# Patient Record
Sex: MALE | Race: BLACK OR AFRICAN AMERICAN | NOT HISPANIC OR LATINO | Employment: STUDENT | URBAN - METROPOLITAN AREA
[De-identification: names, ages, dates, MRNs, and addresses within clinical notes are randomized per-mention and may not be internally consistent; named-entity substitution may affect disease eponyms.]

---
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
## 2025-02-27 ENCOUNTER — HOSPITAL ENCOUNTER (EMERGENCY)
Facility: HOSPITAL | Age: 25
Discharge: HOME | End: 2025-02-27
Attending: EMERGENCY MEDICINE
Payer: COMMERCIAL

## 2025-02-27 ENCOUNTER — APPOINTMENT (EMERGENCY)
Dept: RADIOLOGY | Facility: HOSPITAL | Age: 25
End: 2025-02-27
Payer: COMMERCIAL

## 2025-02-27 ENCOUNTER — APPOINTMENT (EMERGENCY)
Dept: CT IMAGING | Facility: HOSPITAL | Age: 25
End: 2025-02-27
Payer: COMMERCIAL

## 2025-02-27 VITALS
TEMPERATURE: 98.5 F | DIASTOLIC BLOOD PRESSURE: 59 MMHG | RESPIRATION RATE: 12 BRPM | OXYGEN SATURATION: 93 % | SYSTOLIC BLOOD PRESSURE: 114 MMHG | HEART RATE: 73 BPM

## 2025-02-27 DIAGNOSIS — D64.9 ANEMIA: ICD-10-CM

## 2025-02-27 DIAGNOSIS — R07.9 CHEST PAIN: Primary | ICD-10-CM

## 2025-02-27 DIAGNOSIS — R17 ELEVATED BILIRUBIN: ICD-10-CM

## 2025-02-27 LAB
2HR DELTA HS TROPONIN: >1 NG/L
ALBUMIN SERPL BCG-MCNC: 4.7 G/DL (ref 3.5–5)
ALP SERPL-CCNC: 54 U/L (ref 34–104)
ALT SERPL W P-5'-P-CCNC: 6 U/L (ref 7–52)
ANION GAP SERPL CALCULATED.3IONS-SCNC: 10 MMOL/L (ref 4–13)
AST SERPL W P-5'-P-CCNC: 12 U/L (ref 13–39)
ATRIAL RATE: 102 BPM
ATRIAL RATE: 68 BPM
BASOPHILS # BLD AUTO: 0.03 THOUSANDS/ÂΜL (ref 0–0.1)
BASOPHILS NFR BLD AUTO: 1 % (ref 0–1)
BILIRUB SERPL-MCNC: 1.56 MG/DL (ref 0.2–1)
BUN SERPL-MCNC: 14 MG/DL (ref 5–25)
CALCIUM SERPL-MCNC: 9.2 MG/DL (ref 8.4–10.2)
CARDIAC TROPONIN I PNL SERPL HS: 3 NG/L (ref ?–50)
CARDIAC TROPONIN I PNL SERPL HS: <2 NG/L (ref ?–50)
CHLORIDE SERPL-SCNC: 100 MMOL/L (ref 96–108)
CO2 SERPL-SCNC: 23 MMOL/L (ref 21–32)
CREAT SERPL-MCNC: 1 MG/DL (ref 0.6–1.3)
D DIMER PPP FEU-MCNC: 0.31 UG/ML FEU
EOSINOPHIL # BLD AUTO: 0.1 THOUSAND/ÂΜL (ref 0–0.61)
EOSINOPHIL NFR BLD AUTO: 2 % (ref 0–6)
ERYTHROCYTE [DISTWIDTH] IN BLOOD BY AUTOMATED COUNT: 12.7 % (ref 11.6–15.1)
GAS + CO PNL BLDA: 6 % (ref 0–1.5)
GFR SERPL CREATININE-BSD FRML MDRD: 104 ML/MIN/1.73SQ M
GLUCOSE SERPL-MCNC: 143 MG/DL (ref 65–140)
HCT VFR BLD AUTO: 30.2 % (ref 36.5–49.3)
HGB BLD-MCNC: 9.8 G/DL (ref 12–17)
IMM GRANULOCYTES # BLD AUTO: 0.02 THOUSAND/UL (ref 0–0.2)
IMM GRANULOCYTES NFR BLD AUTO: 0 % (ref 0–2)
LIPASE SERPL-CCNC: 15 U/L (ref 11–82)
LYMPHOCYTES # BLD AUTO: 2.08 THOUSANDS/ÂΜL (ref 0.6–4.47)
LYMPHOCYTES NFR BLD AUTO: 35 % (ref 14–44)
MCH RBC QN AUTO: 35 PG (ref 26.8–34.3)
MCHC RBC AUTO-ENTMCNC: 32.5 G/DL (ref 31.4–37.4)
MCV RBC AUTO: 108 FL (ref 82–98)
MONOCYTES # BLD AUTO: 0.47 THOUSAND/ÂΜL (ref 0.17–1.22)
MONOCYTES NFR BLD AUTO: 8 % (ref 4–12)
NEUTROPHILS # BLD AUTO: 3.17 THOUSANDS/ÂΜL (ref 1.85–7.62)
NEUTS SEG NFR BLD AUTO: 54 % (ref 43–75)
NRBC BLD AUTO-RTO: 0 /100 WBCS
P AXIS: 67 DEGREES
P AXIS: 81 DEGREES
PLATELET # BLD AUTO: 195 THOUSANDS/UL (ref 149–390)
PMV BLD AUTO: 11.1 FL (ref 8.9–12.7)
POTASSIUM SERPL-SCNC: 3.2 MMOL/L (ref 3.5–5.3)
PR INTERVAL: 152 MS
PR INTERVAL: 168 MS
PROT SERPL-MCNC: 7 G/DL (ref 6.4–8.4)
QRS AXIS: 78 DEGREES
QRS AXIS: 81 DEGREES
QRSD INTERVAL: 88 MS
QRSD INTERVAL: 88 MS
QT INTERVAL: 324 MS
QT INTERVAL: 388 MS
QTC INTERVAL: 412 MS
QTC INTERVAL: 422 MS
RBC # BLD AUTO: 2.8 MILLION/UL (ref 3.88–5.62)
SODIUM SERPL-SCNC: 133 MMOL/L (ref 135–147)
T WAVE AXIS: 57 DEGREES
T WAVE AXIS: 66 DEGREES
VENTRICULAR RATE: 102 BPM
VENTRICULAR RATE: 68 BPM
WBC # BLD AUTO: 5.87 THOUSAND/UL (ref 4.31–10.16)

## 2025-02-27 PROCEDURE — 94640 AIRWAY INHALATION TREATMENT: CPT

## 2025-02-27 PROCEDURE — 71046 X-RAY EXAM CHEST 2 VIEWS: CPT

## 2025-02-27 PROCEDURE — 83690 ASSAY OF LIPASE: CPT

## 2025-02-27 PROCEDURE — 85379 FIBRIN DEGRADATION QUANT: CPT

## 2025-02-27 PROCEDURE — 93005 ELECTROCARDIOGRAM TRACING: CPT

## 2025-02-27 PROCEDURE — 80053 COMPREHEN METABOLIC PANEL: CPT

## 2025-02-27 PROCEDURE — 93010 ELECTROCARDIOGRAM REPORT: CPT

## 2025-02-27 PROCEDURE — 99285 EMERGENCY DEPT VISIT HI MDM: CPT

## 2025-02-27 PROCEDURE — 36415 COLL VENOUS BLD VENIPUNCTURE: CPT

## 2025-02-27 PROCEDURE — 71250 CT THORAX DX C-: CPT

## 2025-02-27 PROCEDURE — 84484 ASSAY OF TROPONIN QUANT: CPT

## 2025-02-27 PROCEDURE — 85025 COMPLETE CBC W/AUTO DIFF WBC: CPT

## 2025-02-27 PROCEDURE — 82375 ASSAY CARBOXYHB QUANT: CPT

## 2025-02-27 RX ORDER — ALBUTEROL SULFATE 0.83 MG/ML
2.5 SOLUTION RESPIRATORY (INHALATION) ONCE
Status: COMPLETED | OUTPATIENT
Start: 2025-02-27 | End: 2025-02-27

## 2025-02-27 RX ORDER — POTASSIUM CHLORIDE 1500 MG/1
20 TABLET, EXTENDED RELEASE ORAL ONCE
Status: COMPLETED | OUTPATIENT
Start: 2025-02-27 | End: 2025-02-27

## 2025-02-27 RX ORDER — ACETAMINOPHEN 325 MG/1
650 TABLET ORAL ONCE
Status: COMPLETED | OUTPATIENT
Start: 2025-02-27 | End: 2025-02-27

## 2025-02-27 RX ADMIN — ACETAMINOPHEN 650 MG: 325 TABLET, FILM COATED ORAL at 04:36

## 2025-02-27 RX ADMIN — ALBUTEROL SULFATE 2.5 MG: 2.5 SOLUTION RESPIRATORY (INHALATION) at 08:06

## 2025-02-27 RX ADMIN — POTASSIUM CHLORIDE 20 MEQ: 1500 TABLET, EXTENDED RELEASE ORAL at 06:45

## 2025-02-27 NOTE — ED PROVIDER NOTES
Time reflects when diagnosis was documented in both MDM as applicable and the Disposition within this note       Time User Action Codes Description Comment    2/27/2025  6:42 AM Erin Garvin Add [R07.9] Chest pain     2/27/2025  6:42 AM Erin Garvin Add [D64.9] Anemia     2/27/2025  6:42 AM Erin Garvin Add [R17] Elevated bilirubin           ED Disposition       ED Disposition   Discharge    Condition   Stable    Date/Time   Thu Feb 27, 2025  7:09 AM    Comment   Sincere Chavez discharge to home/self care.                   Assessment & Plan       Medical Decision Making  DDX including but not limited to: chest wall pain, costochondritis, pleurisy, pericarditis, myocarditis, PTX, pneumonia, GI etiology; doubt ACS or MI or dissection or PE or rhabdomyolysis.    Will obtain EKG, troponin to evaluate for ACS.  Will obtain chest x-ray to evaluate for cardiopulmonary abnormality including pneumonia, pneumothorax.  Will obtain CBC to evaluate for leukocytosis, anemia.  Will obtain CMP to evaluate kidney function, for electrolyte disturbance.   Will obtain lipase to evaluate for pancreatitis.        Amount and/or Complexity of Data Reviewed  Labs: ordered. Decision-making details documented in ED Course.  Radiology: ordered.    Risk  OTC drugs.  Prescription drug management.        ED Course as of 02/27/25 0728   Thu Feb 27, 2025   0508 hs TnI 0hr: <2   0508 EKG sinus tachycardia at 1 to 2 bpm, normal axis, , QTc 422, no STEMI, no ST depression, diffuse J-point elevation, no previous EKG for comparison as interpreted by me   0615 Repeat EKG normal sinus rhythm at 60 bpm, , QTc 412, early repolarization, no STEMI, no significant change compared to previous EKG as interpreted by me   0616 On reexamination, patient reports pain improved significantly with Tylenol.  Given patient had tachycardia, SpO2 93% on room air, will add on D-dimer though suspect costochondritis.  Discussed anemia with patient  and mother, patient denies melena, hematochezia, hematuria.  They do report family history of anemia.  Discussed elevated bilirubin, patient without right upper quadrant tenderness.   0639 D-Dimer, Quant: 0.31   0640 Delta 2hr hsTnI: >1   0726 D-dimer negative. Delta troponin WINL. However, patient continues with SpO2 of 92% on room air, even with ambulation.  Per RN, carbon monoxide detector read as elevated while patient was ambulating.  Patient does admit to vaping.  Will add carboxyhemoglobin, albuterol treatment, CT chest without contrast.  Care turned over to Hernan Castillo PA-C pending remainder of workup       Medications   albuterol inhalation solution 2.5 mg (has no administration in time range)   acetaminophen (TYLENOL) tablet 650 mg (650 mg Oral Given 2/27/25 0436)   potassium chloride (Klor-Con M20) CR tablet 20 mEq (20 mEq Oral Given 2/27/25 0645)       ED Risk Strat Scores   HEART Risk Score      Flowsheet Row Most Recent Value   Heart Score Risk Calculator    History 0 Filed at: 02/27/2025 0642   ECG 1 Filed at: 02/27/2025 0642   Age 0 Filed at: 02/27/2025 0642   Risk Factors 0 Filed at: 02/27/2025 0642   Troponin 0 Filed at: 02/27/2025 0642   HEART Score 1 Filed at: 02/27/2025 0642          HEART Risk Score      Flowsheet Row Most Recent Value   Heart Score Risk Calculator    History 0 Filed at: 02/27/2025 0642   ECG 1 Filed at: 02/27/2025 0642   Age 0 Filed at: 02/27/2025 0642   Risk Factors 0 Filed at: 02/27/2025 0642   Troponin 0 Filed at: 02/27/2025 0642   HEART Score 1 Filed at: 02/27/2025 0642                              SBIRT 20yo+      Flowsheet Row Most Recent Value   Initial Alcohol Screen: US AUDIT-C     1. How often do you have a drink containing alcohol? 0 Filed at: 02/27/2025 0349   2. How many drinks containing alcohol do you have on a typical day you are drinking?  0 Filed at: 02/27/2025 0349   3a. Male UNDER 65: How often do you have five or more drinks on one occasion? 0 Filed  at: 02/27/2025 0349   Audit-C Score 0 Filed at: 02/27/2025 0349   LADY: How many times in the past year have you...    Used an illegal drug or used a prescription medication for non-medical reasons? Never Filed at: 02/27/2025 0349                            History of Present Illness       Chief Complaint   Patient presents with    Chest Pain     Patient c/o of cp that started 3 hours ago and states it as a sharp/needle sensation with worsen pain for movement. Currently 8 out of 10. No cardiac hx        History reviewed. No pertinent past medical history.   Past Surgical History:   Procedure Laterality Date    NO PAST SURGERIES      WRIST SURGERY Left       History reviewed. No pertinent family history.   Social History     Tobacco Use    Smoking status: Never    Smokeless tobacco: Never   Substance Use Topics    Alcohol use: No    Drug use: No      E-Cigarette/Vaping      E-Cigarette/Vaping Substances      I have reviewed and agree with the history as documented.     The patient is a 24-year-old male with no significant past medical history who presents to the ED for evaluation of chest pain that began 2 to 3 hours prior to arrival.  He describes it as a sharp, needle sensation to his right chest that occasionally has associated paresthesias in the right arm.  Waxing and waning in severity.  Moving side-to-side worsens pain.  He has not taken anything for symptoms.  Leaning/lying back does not affect pain.  He otherwise denies fever, chills, cough, abdominal pain, nausea, vomiting, diarrhea,  leg swelling, calf pain, recent travel, recent surgery, hemoptysis, history of DVT/PE.  He does have a family cardiac history.        Review of Systems   Constitutional:  Negative for chills and fever.   HENT:  Negative for congestion and rhinorrhea.    Respiratory:  Negative for cough and shortness of breath.    Cardiovascular:  Positive for chest pain. Negative for leg swelling.   Gastrointestinal:  Negative for abdominal  pain, constipation, diarrhea, nausea and vomiting.   Genitourinary:  Negative for dysuria and flank pain.   Musculoskeletal:  Negative for arthralgias and myalgias.   Skin:  Negative for rash and wound.   Neurological:  Negative for dizziness, weakness, numbness and headaches.           Objective       ED Triage Vitals [02/27/25 0348]   Temperature Pulse Blood Pressure Respirations SpO2 Patient Position - Orthostatic VS   98.5 °F (36.9 °C) 105 132/74 16 93 % Lying      Temp Source Heart Rate Source BP Location FiO2 (%) Pain Score    Oral Monitor Right arm -- 8      Vitals      Date and Time Temp Pulse SpO2 Resp BP Pain Score FACES Pain Rating User   02/27/25 0715 -- -- 92 % up to 94 -- -- -- -- AW   02/27/25 0628 -- 81 92 % 16 118/57 -- -- JT   02/27/25 0600 -- 70 92 % 17 114/65 -- -- JT   02/27/25 0436 -- -- -- -- -- 8 -- JT   02/27/25 0400 -- 81 93 % 16 132/74 -- -- JT   02/27/25 0348 98.5 °F (36.9 °C) 105 93 % 16 132/74 8 -- JT            Physical Exam  Vitals and nursing note reviewed.   Constitutional:       General: He is not in acute distress.     Appearance: He is well-developed. He is not toxic-appearing.   HENT:      Head: Normocephalic and atraumatic.   Eyes:      Conjunctiva/sclera: Conjunctivae normal.   Cardiovascular:      Rate and Rhythm: Normal rate and regular rhythm.      Pulses:           Radial pulses are 2+ on the right side and 2+ on the left side.        Dorsalis pedis pulses are 2+ on the right side and 2+ on the left side.      Heart sounds: No murmur heard.  Pulmonary:      Effort: Pulmonary effort is normal. No respiratory distress.      Breath sounds: Normal breath sounds. No decreased breath sounds, wheezing, rhonchi or rales.   Abdominal:      Palpations: Abdomen is soft.      Tenderness: There is no abdominal tenderness.   Musculoskeletal:         General: No swelling.      Cervical back: Neck supple.      Right lower leg: No tenderness. No edema.      Left lower leg: No tenderness.  No edema.   Skin:     General: Skin is warm and dry.      Capillary Refill: Capillary refill takes less than 2 seconds.   Neurological:      Mental Status: He is alert.      Sensory: Sensation is intact.      Motor: Motor function is intact.   Psychiatric:         Mood and Affect: Mood normal.         Results Reviewed       Procedure Component Value Units Date/Time    Carboxyhemoglobin [[id number]]     Lab Status: No result Specimen: Blood     HS Troponin I 2hr [[id number]]  (Normal) Collected: 02/27/25 0611    Lab Status: Final result Specimen: Blood from Arm, Right Updated: 02/27/25 0639     hs TnI 2hr 3 ng/L      Delta 2hr hsTnI >1 ng/L     D-Dimer [[id number]]  (Normal) Collected: 02/27/25 0611    Lab Status: Final result Specimen: Blood from Arm, Right Updated: 02/27/25 0634     D-Dimer, Quant 0.31 ug/ml FEU     HS Troponin 0hr (reflex protocol) [[id number]]  (Normal) Collected: 02/27/25 0436    Lab Status: Final result Specimen: Blood from Arm, Right Updated: 02/27/25 0503     hs TnI 0hr <2 ng/L     Comprehensive metabolic panel [[id number]]  (Abnormal) Collected: 02/27/25 0436    Lab Status: Final result Specimen: Blood from Arm, Right Updated: 02/27/25 0457     Sodium 133 mmol/L      Potassium 3.2 mmol/L      Chloride 100 mmol/L      CO2 23 mmol/L      ANION GAP 10 mmol/L      BUN 14 mg/dL      Creatinine 1.00 mg/dL      Glucose 143 mg/dL      Calcium 9.2 mg/dL      AST 12 U/L      ALT 6 U/L      Alkaline Phosphatase 54 U/L      Total Protein 7.0 g/dL      Albumin 4.7 g/dL      Total Bilirubin 1.56 mg/dL      eGFR 104 ml/min/1.73sq m     Narrative:      [employer] guidelines for Chronic Kidney Disease (CKD):     Stage 1 with normal or high GFR (GFR > 90 mL/min/1.73 square meters)    Stage 2 Mild CKD (GFR = 60-89 mL/min/1.73 square meters)    Stage 3A Moderate CKD (GFR = 45-59 mL/min/1.73 square meters)    Stage 3B Moderate CKD (GFR = 30-44 mL/min/1.73 square meters)    Stage 4 Severe CKD (GFR  = 15-29 mL/min/1.73 square meters)    Stage 5 End Stage CKD (GFR <15 mL/min/1.73 square meters)  Note: GFR calculation is accurate only with a steady state creatinine    Lipase [[id number]]  (Normal) Collected: 02/27/25 0436    Lab Status: Final result Specimen: Blood from Arm, Right Updated: 02/27/25 0457     Lipase 15 u/L     CBC and differential [[id number]]  (Abnormal) Collected: 02/27/25 0436    Lab Status: Final result Specimen: Blood from Arm, Right Updated: 02/27/25 0442     WBC 5.87 Thousand/uL      RBC 2.80 Million/uL      Hemoglobin 9.8 g/dL      Hematocrit 30.2 %       fL      MCH 35.0 pg      MCHC 32.5 g/dL      RDW 12.7 %      MPV 11.1 fL      Platelets 195 Thousands/uL      nRBC 0 /100 WBCs      Segmented % 54 %      Immature Grans % 0 %      Lymphocytes % 35 %      Monocytes % 8 %      Eosinophils Relative 2 %      Basophils Relative 1 %      Absolute Neutrophils 3.17 Thousands/µL      Absolute Immature Grans 0.02 Thousand/uL      Absolute Lymphocytes 2.08 Thousands/µL      Absolute Monocytes 0.47 Thousand/µL      Eosinophils Absolute 0.10 Thousand/µL      Basophils Absolute 0.03 Thousands/µL             XR chest 2 views   Final Interpretation by Barrington Guo MD (02/27 0655)      No acute cardiopulmonary disease.            Workstation performed: [id number]         CT chest without contrast    (Results Pending)       Procedures    ED Medication and Procedure Management   Prior to Admission Medications   Prescriptions Last Dose Informant Patient Reported? Taking?   mupirocin (BACTROBAN) 2 % nasal ointment   No No   Sig: Apply to affected area twice  daily      Facility-Administered Medications: None     Patient's Medications   Discharge Prescriptions    No medications on file     No discharge procedures on file.  ED SEPSIS DOCUMENTATION   Time reflects when diagnosis was documented in both MDM as applicable and the Disposition within this note       Time User Action Codes Description Comment     2/27/2025  6:42 AM Erin Garvin [R07.9] Chest pain     2/27/2025  6:42 AM Erin Garvin [D64.9] Anemia     2/27/2025  6:42 AM Erin Garvin [R17] Elevated bilirubin                  Erin Garvin PA-C  02/27/25 0728

## 2025-02-27 NOTE — ED CARE HANDOFF
Emergency Department Sign Out Note        Sign out and transfer of care from Erin Kennedy PA-C. See Separate Emergency Department note.     The patient, Johnnie Chavez, was evaluated by the previous provider for chest pain.    Workup Completed:  Chief Complaint   Patient presents with    Chest Pain       Patient c/o of cp that started 3 hours ago and states it as a sharp/needle sensation with worsen pain for movement. Currently 8 out of 10. No cardiac hx          Medical History   History reviewed. No pertinent past medical history.      Surgical History         Past Surgical History:   Procedure Laterality Date    NO PAST SURGERIES        WRIST SURGERY Left           Family History   History reviewed. No pertinent family history.      Social History   Social History           Tobacco Use    Smoking status: Never    Smokeless tobacco: Never   Substance Use Topics    Alcohol use: No    Drug use: No         E-Cigarette/Vaping      E-Cigarette/Vaping Substances      I have reviewed and agree with the history as documented.      The patient is a 24-year-old male with no significant past medical history who presents to the ED for evaluation of chest pain that began 2 to 3 hours prior to arrival.  He describes it as a sharp, needle sensation to his right chest that occasionally has associated paresthesias in the right arm.  Waxing and waning in severity.  Moving side-to-side worsens pain.  He has not taken anything for symptoms.  Leaning/lying back does not affect pain.  He otherwise denies fever, chills, cough, abdominal pain, nausea, vomiting, diarrhea,  leg swelling, calf pain, recent travel, recent surgery, hemoptysis, history of DVT/PE.  He does have a family cardiac history.        Review of Systems   Constitutional:  Negative for chills and fever.   HENT:  Negative for congestion and rhinorrhea.    Respiratory:  Negative for cough and shortness of breath.    Cardiovascular:  Positive for chest pain. Negative  for leg swelling.   Gastrointestinal:  Negative for abdominal pain, constipation, diarrhea, nausea and vomiting.   Genitourinary:  Negative for dysuria and flank pain.   Musculoskeletal:  Negative for arthralgias and myalgias.   Skin:  Negative for rash and wound.   Neurological:  Negative for dizziness, weakness, numbness and headaches.               Objective[]Expand by Default               ED Triage Vitals [02/27/25 0348]   Temperature Pulse Blood Pressure Respirations SpO2 Patient Position - Orthostatic VS   98.5 °F (36.9 °C) 105 132/74 16 93 % Lying       Temp Source Heart Rate Source BP Location FiO2 (%) Pain Score     Oral Monitor Right arm -- 8        Vitals       Date and Time Temp Pulse SpO2 Resp BP Pain Score FACES Pain Rating User   02/27/25 0436 -- -- -- -- -- 8 -- JT   02/27/25 0400 -- 81 93 % 16 132/74 -- -- JT   02/27/25 0348 98.5 °F (36.9 °C) 105 93 % 16 132/74 8 -- JT                Physical Exam  Vitals and nursing note reviewed.   Constitutional:       General: He is not in acute distress.     Appearance: He is well-developed. He is not toxic-appearing.   HENT:      Head: Normocephalic and atraumatic.   Eyes:      Conjunctiva/sclera: Conjunctivae normal.   Cardiovascular:      Rate and Rhythm: Normal rate and regular rhythm.      Pulses:           Radial pulses are 2+ on the right side and 2+ on the left side.        Dorsalis pedis pulses are 2+ on the right side and 2+ on the left side.      Heart sounds: No murmur heard.  Pulmonary:      Effort: Pulmonary effort is normal. No respiratory distress.      Breath sounds: Normal breath sounds. No decreased breath sounds, wheezing, rhonchi or rales.   Abdominal:      Palpations: Abdomen is soft.      Tenderness: There is no abdominal tenderness.   Musculoskeletal:         General: No swelling.      Cervical back: Neck supple.      Right lower leg: No tenderness. No edema.      Left lower leg: No tenderness. No edema.   Skin:     General: Skin is  warm and dry.      Capillary Refill: Capillary refill takes less than 2 seconds.   Neurological:      Mental Status: He is alert.      Sensory: Sensation is intact.      Motor: Motor function is intact.   Psychiatric:         Mood and Affect: Mood normal.         Results Reviewed         Procedure Component Value Units Date/Time     CBC and differential [[id number]]  (Abnormal) Collected: 02/27/25 0436     Lab Status: Final result Specimen: Blood from Arm, Right Updated: 02/27/25 0442       WBC 5.87 Thousand/uL         RBC 2.80 Million/uL         Hemoglobin 9.8 g/dL         Hematocrit 30.2 %          fL         MCH 35.0 pg         MCHC 32.5 g/dL         RDW 12.7 %         MPV 11.1 fL         Platelets 195 Thousands/uL         nRBC 0 /100 WBCs         Segmented % 54 %         Immature Grans % 0 %         Lymphocytes % 35 %         Monocytes % 8 %         Eosinophils Relative 2 %         Basophils Relative 1 %         Absolute Neutrophils 3.17 Thousands/µL         Absolute Immature Grans 0.02 Thousand/uL         Absolute Lymphocytes 2.08 Thousands/µL         Absolute Monocytes 0.47 Thousand/µL         Eosinophils Absolute 0.10 Thousand/µL         Basophils Absolute 0.03 Thousands/µL       Comprehensive metabolic panel [[id number]] Collected: 02/27/25 0436     Lab Status: In process Specimen: Blood from Arm, Right Updated: 02/27/25 0440     Lipase [[id number]] Collected: 02/27/25 0436     Lab Status: In process Specimen: Blood from Arm, Right Updated: 02/27/25 0440     HS Troponin 0hr (reflex protocol) [[id number]] Collected: 02/27/25 0436     Lab Status: In process Specimen: Blood from Arm, Right Updated: 02/27/25 0440                XR chest 2 views    (Results Pending)             ED Course / Workup Pending (followup):  Chest x-ray      HEART Risk Score      Flowsheet Row Most Recent Value   Heart Score Risk Calculator    History 0 Filed at: 02/27/2025 0642   ECG 1 Filed at: 02/27/2025 0642   Age 0 Filed at:  02/27/2025 0642   Risk Factors 0 Filed at: 02/27/2025 0642   Troponin 0 Filed at: 02/27/2025 0642   HEART Score 1 Filed at: 02/27/2025 0642                                       Procedures  Medical Decision Making  24-year-old male presenting ED chief complaint of chest pain.  Patient seen and evaluated by previous provider Erin Kennedy PA-C.  Patient signed out pending carboxyhemoglobin level along with CT scan of chest.  Patient had a episode last night of sudden onset of chest pain.  This chest pain has since resolved.  Patient today patient noted to be satting 92-94.  With her baseline labs found to have anemia along with hypokalemia which was pleated in ED today.  Mom does have history of anemia.  D-dimer today was negative.  With an overall negative cardiac workup.  Through history from the patient he does have a poor diet in which there are multiple days where he goes and he does not even think about eating food.  He stated that when he does eat sometimes it is not the healthiest food choices.  Patient also states that his sleeping habits have been not the greatest lately.  He also endorses using a vape.  Carboxyhemoglobin level 6% which is consistent with a mild smoking history.  Patient at this time is asymptomatic and stated that he feels significantly better than when coming to the ED.  He is satting at 94% after a breathing treatment in the ED.  He stated that the breathing treatment did help to open him up a lot.  With the patient's anemia he was given an ambulatory referral to hematology.  I advised that is important to make this follow-up appointment for the reevaluation of the anemia seen today.  CT scan of the chest today showing no acute abnormalities but there was noted to be a decrease in intracranial blood pool density which is correlating with the patient's test today of anemia.  There is also a small hemangioma on the T1 pedicle which I advised he can follow-up with his family doctor for.   "He is asymptomatic from this.  No acute pulmonary pathology.  I did advise the patient to stop vaping as this could be attributing to some of the chest pain that he may have experienced.  I advised to establish care with family doctor which information was provided to the patient.  Mom was present in the room and also acknowledged these importance of follow-up.  Patient was given strict return to ED protocol with any worsening symptoms that were thoroughly explained on discharge.  Disposition explained with follow-ups.  Patient stable and agreeable to discharge.    Patient understood diagnosis and treatment plan and had no further questions.  Patient was discharged in stable condition.  Patient was advised to follow-up with  PCP in 1 to 2 days.  Patient was advised to return to the ED with any worsening symptoms that were explained on discharge including but not limited to chest pain, shortness of breath, irretractable vomiting or diarrhea, vision loss, loss of function, loss of sensation, syncope, hemoptysis, hematochezia, hematemesis, melena, decreased oral intake, feeling ill.     Ddx-ACS, pneumothorax, PE, pneumonia, viral illness, viral syndrome, electrolyte abnormality, anemia electrolyte abnormality    Portions of the record may have been created with voice recognition software. Occasional wrong word or \"sound a like\" substitutions may have occurred due to the inherent limitations of voice recognition software. Read the chart carefully and recognize, using context, where substitutions have occurred.      Amount and/or Complexity of Data Reviewed  Labs: ordered.     Details: See previous provider note  Radiology: ordered.     Details: See previous provider note    Risk  OTC drugs.  Prescription drug management.  Risk Details: Risk of worsening symptoms along with signs and symptoms worsening symptoms were thoroughly explained on discharge.  Risk of incomplete follow-up was discussed.  Patient had full " understanding of all risks had no further questions and was discharged in stable condition.             Disposition  Final diagnoses:   Chest pain   Anemia   Elevated bilirubin     Time reflects when diagnosis was documented in both MDM as applicable and the Disposition within this note       Time User Action Codes Description Comment    2/27/2025  6:42 AM Erin Garvin Add [R07.9] Chest pain     2/27/2025  6:42 AM Erin Garvin Add [D64.9] Anemia     2/27/2025  6:42 AM Erin Garvin Add [R17] Elevated bilirubin           ED Disposition       ED Disposition   Discharge    Condition   Stable    Date/Time   Thu Feb 27, 2025 0709    Comment   Sincere Chavez discharge to home/self care.                   Follow-up Information       Follow up With Specialties Details Why Contact Info Additional Information    Naomi Camarena MD Family Medicine   [address] [place] [id number]  [phone]       [facility] Emergency Medicine   [address] [place] [id number]  [phone] [facility], [address] [place] [address], [phone]    [facility]   [address] [place] [id number] [phone] [facility], [address] [place] [address], [id number] [phone]          Discharge Medication List as of 2/27/2025  8:58 AM        CONTINUE these medications which have NOT CHANGED    Details   mupirocin (BACTROBAN) 2 % nasal ointment Apply to affected area twice  daily, Print                  ED Provider  Electronically Signed by     Hernan Castillo PA-C  02/27/25 0919

## 2025-02-27 NOTE — Clinical Note
Rachelle accompanied Johnnie Chavez to the emergency department on 2/27/2025.    Return date if applicable: 02/28/2025        If you have any questions or concerns, please don't hesitate to call.      Erin Garvin PA-C

## 2025-02-27 NOTE — DISCHARGE INSTRUCTIONS
Follow-up with your primary care doctor soon as possible for anemia, chest pain, and elevated bilirubin.  Referral placed to hematology.  Patient advised to follow-up.    Return for worsening chest pain, or for any trouble breathing, leg swelling, coughing up blood, passing out, or any other new/concerning symptoms     Take Tylenol, Ibuprofen as needed for pain

## 2025-02-27 NOTE — Clinical Note
Rachelle Ventura accompanied Sincesanjana Chavez to the emergency department on 2/27/2025.    Return date if applicable: 02/28/2025        If you have any questions or concerns, please don't hesitate to call.      Cindy Brooks RN

## 2025-06-08 ENCOUNTER — APPOINTMENT (EMERGENCY)
Dept: RADIOLOGY | Facility: HOSPITAL | Age: 25
End: 2025-06-08
Payer: COMMERCIAL

## 2025-06-08 ENCOUNTER — HOSPITAL ENCOUNTER (EMERGENCY)
Facility: HOSPITAL | Age: 25
Discharge: HOME | End: 2025-06-08
Attending: INTERNAL MEDICINE | Admitting: INTERNAL MEDICINE
Payer: COMMERCIAL

## 2025-06-08 VITALS
HEART RATE: 64 BPM | TEMPERATURE: 99.3 F | RESPIRATION RATE: 16 BRPM | DIASTOLIC BLOOD PRESSURE: 69 MMHG | WEIGHT: 122.36 LBS | SYSTOLIC BLOOD PRESSURE: 117 MMHG | OXYGEN SATURATION: 93 %

## 2025-06-08 DIAGNOSIS — R59.0 AXILLARY LYMPHADENOPATHY: ICD-10-CM

## 2025-06-08 DIAGNOSIS — M79.602 LEFT ARM PAIN: Primary | ICD-10-CM

## 2025-06-08 LAB
ALBUMIN SERPL BCG-MCNC: 4.8 G/DL (ref 3.5–5)
ALP SERPL-CCNC: 56 U/L (ref 34–104)
ALT SERPL W P-5'-P-CCNC: 5 U/L (ref 7–52)
ANION GAP SERPL CALCULATED.3IONS-SCNC: 5 MMOL/L (ref 4–13)
AST SERPL W P-5'-P-CCNC: 14 U/L (ref 13–39)
ATRIAL RATE: 61 BPM
BASOPHILS # BLD AUTO: 0.02 THOUSANDS/ÂΜL (ref 0–0.1)
BASOPHILS NFR BLD AUTO: 0 % (ref 0–1)
BILIRUB SERPL-MCNC: 1.03 MG/DL (ref 0.2–1)
BUN SERPL-MCNC: 6 MG/DL (ref 5–25)
CALCIUM SERPL-MCNC: 9.6 MG/DL (ref 8.4–10.2)
CARDIAC TROPONIN I PNL SERPL HS: 3 NG/L (ref ?–50)
CHLORIDE SERPL-SCNC: 105 MMOL/L (ref 96–108)
CO2 SERPL-SCNC: 27 MMOL/L (ref 21–32)
CREAT SERPL-MCNC: 0.92 MG/DL (ref 0.6–1.3)
D DIMER PPP FEU-MCNC: <0.27 UG/ML FEU
EOSINOPHIL # BLD AUTO: 0.03 THOUSAND/ÂΜL (ref 0–0.61)
EOSINOPHIL NFR BLD AUTO: 1 % (ref 0–6)
ERYTHROCYTE [DISTWIDTH] IN BLOOD BY AUTOMATED COUNT: 12.7 % (ref 11.6–15.1)
GFR SERPL CREATININE-BSD FRML MDRD: 116 ML/MIN/1.73SQ M
GLUCOSE SERPL-MCNC: 88 MG/DL (ref 65–140)
HCT VFR BLD AUTO: 41.1 % (ref 36.5–49.3)
HGB BLD-MCNC: 13.5 G/DL (ref 12–17)
IMM GRANULOCYTES # BLD AUTO: 0.01 THOUSAND/UL (ref 0–0.2)
IMM GRANULOCYTES NFR BLD AUTO: 0 % (ref 0–2)
LYMPHOCYTES # BLD AUTO: 1.36 THOUSANDS/ÂΜL (ref 0.6–4.47)
LYMPHOCYTES NFR BLD AUTO: 27 % (ref 14–44)
MCH RBC QN AUTO: 30.7 PG (ref 26.8–34.3)
MCHC RBC AUTO-ENTMCNC: 32.8 G/DL (ref 31.4–37.4)
MCV RBC AUTO: 93 FL (ref 82–98)
MONOCYTES # BLD AUTO: 0.34 THOUSAND/ÂΜL (ref 0.17–1.22)
MONOCYTES NFR BLD AUTO: 7 % (ref 4–12)
NEUTROPHILS # BLD AUTO: 3.28 THOUSANDS/ÂΜL (ref 1.85–7.62)
NEUTS SEG NFR BLD AUTO: 65 % (ref 43–75)
NRBC BLD AUTO-RTO: 0 /100 WBCS
P AXIS: 79 DEGREES
PLATELET # BLD AUTO: 158 THOUSANDS/UL (ref 149–390)
PMV BLD AUTO: 11.6 FL (ref 8.9–12.7)
POTASSIUM SERPL-SCNC: 4.5 MMOL/L (ref 3.5–5.3)
PR INTERVAL: 160 MS
PROT SERPL-MCNC: 7.3 G/DL (ref 6.4–8.4)
QRS AXIS: 88 DEGREES
QRSD INTERVAL: 88 MS
QT INTERVAL: 396 MS
QTC INTERVAL: 398 MS
RBC # BLD AUTO: 4.4 MILLION/UL (ref 3.88–5.62)
SODIUM SERPL-SCNC: 137 MMOL/L (ref 135–147)
T WAVE AXIS: 64 DEGREES
VENTRICULAR RATE: 61 BPM
WBC # BLD AUTO: 5.04 THOUSAND/UL (ref 4.31–10.16)

## 2025-06-08 PROCEDURE — 85025 COMPLETE CBC W/AUTO DIFF WBC: CPT

## 2025-06-08 PROCEDURE — 93010 ELECTROCARDIOGRAM REPORT: CPT | Performed by: INTERNAL MEDICINE

## 2025-06-08 PROCEDURE — 36415 COLL VENOUS BLD VENIPUNCTURE: CPT

## 2025-06-08 PROCEDURE — 99283 EMERGENCY DEPT VISIT LOW MDM: CPT

## 2025-06-08 PROCEDURE — 84484 ASSAY OF TROPONIN QUANT: CPT

## 2025-06-08 PROCEDURE — 93005 ELECTROCARDIOGRAM TRACING: CPT

## 2025-06-08 PROCEDURE — 96374 THER/PROPH/DIAG INJ IV PUSH: CPT

## 2025-06-08 PROCEDURE — 85379 FIBRIN DEGRADATION QUANT: CPT

## 2025-06-08 PROCEDURE — 71046 X-RAY EXAM CHEST 2 VIEWS: CPT

## 2025-06-08 PROCEDURE — 99284 EMERGENCY DEPT VISIT MOD MDM: CPT

## 2025-06-08 PROCEDURE — 80053 COMPREHEN METABOLIC PANEL: CPT

## 2025-06-08 RX ORDER — KETOROLAC TROMETHAMINE 30 MG/ML
15 INJECTION, SOLUTION INTRAMUSCULAR; INTRAVENOUS ONCE
Status: COMPLETED | OUTPATIENT
Start: 2025-06-08 | End: 2025-06-08

## 2025-06-08 RX ADMIN — KETOROLAC TROMETHAMINE 15 MG: 30 INJECTION, SOLUTION INTRAMUSCULAR; INTRAVENOUS at 16:29

## 2025-06-08 NOTE — DISCHARGE INSTRUCTIONS
Use Tylenol, ibuprofen for pain control as needed.  Follow-up with your primary care doctor to ensure resolution of symptoms.

## 2025-06-08 NOTE — ED PROVIDER NOTES
Time reflects when diagnosis was documented in both MDM as applicable and the Disposition within this note       Time User Action Codes Description Comment    6/8/2025  4:10 PM John, Bogard Add [M79.602] Left arm pain     6/8/2025  4:11 PM John, Jose Rey [R59.0] Axillary lymphadenopathy           ED Disposition       ED Disposition   Discharge    Condition   Stable    Date/Time   Sun Jun 8, 2025  4:10 PM    Comment   Sincere Chavez discharge to home/self care.                   Assessment & Plan       Medical Decision Making  Differential diagnosis includes but not limited to lymphadenopathy, PE, MI, ACS, arrhythmia, pneumonia, pneumothorax, intercostal muscle strain    24-year-old female presents to ED for evaluation of left axilla infection as seen in HPI.  On physical examination patient normotensive.  Mildly tachycardic at 103 bpm.  Likely due to anxiety about situation.  Nonhypoxic.  Nontoxic-appearing.  Alert and responding to questions appropriately.  No murmur.  Normal breath sounds.  Moving all 4 extremities without difficulty.  Does have small nodular lesion in the left axillary lymph node chain.  Most likely inflamed lymph node.  No evidence of acute infection, abscess, cellulitis, etc..  Initially obtain chest x-ray which returned without evidence of acute cardiopulmonary disease on personal interpretation.  After review of patient's previous visit for chest pain, obtained further workup consisting of CBC, CMP, D-dimer, troponin, EKG.  CBC WNL.  CMP without concerning values.  D-dimer nonelevated.  Troponin 3 NG/L.  EKG without evidence of acute cardiac injury, arrhythmia.  Discussed results of workup with patient and his mom.  Anemia seems to have improved since previous visit.  On further conversation patient does admit to sleeping on a different mattress recently.  Could explain intercostal muscle pain.  The numbness of his middle finger he experienced last night likely related to body  "positioning at time of symptom onset.  Advised to follow-up with PCP to ensure resolution of symptoms.  Return precautions discussed.  Patient discharged.    Prior to discharge, discharge instructions were discussed with patient at bedside. Patient was provided both verbal and written instructions. Patient is understanding of the discharge instructions and is agreeable to plan of care. Return precautions were discussed with patient bedside, patient verbalized understanding of signs and symptoms that would necessitate return to the ED. All questions were answered. Patient was comfortable with the plan of care and discharged to home.    Portions of this chart may have been written with voice recognition software.  Occasional grammatical errors, wrong word or \"sound a like\" substitutions may have occurred due to software limitations.  Please read carefully and use context to recognize where substitutions have occurred.     Amount and/or Complexity of Data Reviewed  Labs: ordered.  Radiology: ordered and independent interpretation performed.    Risk  Prescription drug management.             Medications   ketorolac (TORADOL) injection 15 mg (15 mg Intravenous Given 6/8/25 1629)       ED Risk Strat Scores      HEART Risk Score      Flowsheet Row Most Recent Value   Heart Score Risk Calculator    History 0 Filed at: 06/08/2025 1611   ECG 0 Filed at: 06/08/2025 1611   Age 0 Filed at: 06/08/2025 1611   Risk Factors 0 Filed at: 06/08/2025 1611   Troponin 0 Filed at: 06/08/2025 1611   HEART Score 0 Filed at: 06/08/2025 1611                      No data recorded            Wells' Criteria for PE      Flowsheet Row Most Recent Value   Wells' Criteria for PE    Clinical signs and symptoms of DVT 0 Filed at: 06/08/2025 1612   PE is primary diagnosis or equally likely 0 Filed at: 06/08/2025 1612   HR >100 1.5 Filed at: 06/08/2025 1612   Immobilization at least 3 days or Surgery in the previous 4 weeks 0 Filed at: 06/08/2025 1612 "   Previous, objectively diagnosed PE or DVT 0 Filed at: 06/08/2025 1612   Hemoptysis 0 Filed at: 06/08/2025 1612   Malignancy with treatment within 6 months or palliative 0 Filed at: 06/08/2025 1612   Wells' Criteria Total 1.5 Filed at: 06/08/2025 1612                        History of Present Illness       Chief Complaint   Patient presents with    Arm Pain     Patient brought by EMS from home with complaints of L axilla mass that has been present for 3-4 years, occasionally tender to palpation. Has been seen for same previously. No redness, swelling. Also with complaints of L ring finger discomfort and radiation of pain into wrist/forearm. No known injury.        Past Medical History[1]   Past Surgical History[2]   Family History[3]   Social History[4]   E-Cigarette/Vaping      E-Cigarette/Vaping Substances      I have reviewed and agree with the history as documented.     24-year-old male presents to ED for evaluation of left axilla lesion.  Patient accompanied by his mom.  Patient called EMS today to come to the ED for evaluation of a left axillary lesion that has been present for the past couple years.  The lesion itself is a small mobile lesion near the lateral aspect of the left scapula.  Occasionally it will get larger and reduce in size with time.  Last night he was talking on the phone with his mom.  While talking to his mom he noticed that his left middle finger went numb.  It was transient and went away.  Patient states that he was also having pain that started from the small lesion radiating across his chest.  Denies any other concerns today.     Review of patient's chart demonstrates that in February 2025 she was seen for chest pain in the ED.  At that time patient had anemia on CBC.  Was advised to see hematology.  Follow-up has not occurred.        Review of Systems   Musculoskeletal:         Positive left lymphadenopathy of axilla   All other systems reviewed and are negative.          Objective        ED Triage Vitals [06/08/25 1350]   Temperature Pulse Blood Pressure Respirations SpO2 Patient Position - Orthostatic VS   99.3 °F (37.4 °C) 103 141/80 16 96 % Sitting      Temp Source Heart Rate Source BP Location FiO2 (%) Pain Score    Oral Monitor Right arm -- 7      Vitals      Date and Time Temp Pulse SpO2 Resp BP Pain Score FACES Pain Rating User   06/08/25 1534 -- 64 93 % 16 117/69 -- -- KG   06/08/25 1350 99.3 °F (37.4 °C) 103 96 % 16 141/80 7 -- TMS            Physical Exam  Vitals and nursing note reviewed.   Constitutional:       General: He is not in acute distress.     Appearance: Normal appearance. He is well-developed. He is not ill-appearing, toxic-appearing or diaphoretic.   HENT:      Head: Normocephalic and atraumatic.     Eyes:      Conjunctiva/sclera: Conjunctivae normal.       Cardiovascular:      Rate and Rhythm: Regular rhythm. Tachycardia present.      Pulses: Normal pulses.      Heart sounds: Normal heart sounds. No murmur heard.     No friction rub. No gallop.   Pulmonary:      Effort: Pulmonary effort is normal. No respiratory distress.      Breath sounds: Normal breath sounds. No stridor. No wheezing, rhonchi or rales.   Abdominal:      Palpations: Abdomen is soft.      Tenderness: There is no abdominal tenderness. There is no guarding.     Musculoskeletal:         General: No swelling.      Cervical back: Neck supple.      Comments: No concerning palpable lesions in the left axillary region.  There is a small mobile lesion along the axillary lymph node chain.  Nothing to suggest abscess, cellulitis, acute infectious process.      Skin:     General: Skin is warm and dry.      Capillary Refill: Capillary refill takes less than 2 seconds.     Neurological:      Mental Status: He is alert.     Psychiatric:         Mood and Affect: Mood normal.         Results Reviewed       Procedure Component Value Units Date/Time    HS Troponin 0hr (reflex protocol) [[id number]]  (Normal) Collected:  06/08/25 1531    Lab Status: Final result Specimen: Blood from Arm, Right Updated: 06/08/25 1600     hs TnI 0hr 3 ng/L     D-Dimer [[id number]]  (Normal) Collected: 06/08/25 1531    Lab Status: Final result Specimen: Blood from Arm, Right Updated: 06/08/25 1554     D-Dimer, Quant <0.27 ug/ml FEU     Comprehensive metabolic panel [[id number]]  (Abnormal) Collected: 06/08/25 1531    Lab Status: Final result Specimen: Blood from Arm, Right Updated: 06/08/25 1554     Sodium 137 mmol/L      Potassium 4.5 mmol/L      Chloride 105 mmol/L      CO2 27 mmol/L      ANION GAP 5 mmol/L      BUN 6 mg/dL      Creatinine 0.92 mg/dL      Glucose 88 mg/dL      Calcium 9.6 mg/dL      AST 14 U/L      ALT 5 U/L      Alkaline Phosphatase 56 U/L      Total Protein 7.3 g/dL      Albumin 4.8 g/dL      Total Bilirubin 1.03 mg/dL      eGFR 116 ml/min/1.73sq m     Narrative:      National Kid[employer] Foundation guidelines for Chronic Kidney Disease (CKD):     Stage 1 with normal or high GFR (GFR > 90 mL/min/1.73 square meters)    Stage 2 Mild CKD (GFR = 60-89 mL/min/1.73 square meters)    Stage 3A Moderate CKD (GFR = 45-59 mL/min/1.73 square meters)    Stage 3B Moderate CKD (GFR = 30-44 mL/min/1.73 square meters)    Stage 4 Severe CKD (GFR = 15-29 mL/min/1.73 square meters)    Stage 5 End Stage CKD (GFR <15 mL/min/1.73 square meters)  Note: GFR calculation is accurate only with a steady state creatinine    CBC and differential [[id number]] Collected: 06/08/25 1531    Lab Status: Final result Specimen: Blood from Arm, Right Updated: 06/08/25 1539     WBC 5.04 Thousand/uL      RBC 4.40 Million/uL      Hemoglobin 13.5 g/dL      Hematocrit 41.1 %      MCV 93 fL      MCH 30.7 pg      MCHC 32.8 g/dL      RDW 12.7 %      MPV 11.6 fL      Platelets 158 Thousands/uL      nRBC 0 /100 WBCs      Segmented % 65 %      Immature Grans % 0 %      Lymphocytes % 27 %      Monocytes % 7 %      Eosinophils Relative 1 %      Basophils Relative 0 %      Absolute  Neutrophils 3.28 Thousands/µL      Absolute Immature Grans 0.01 Thousand/uL      Absolute Lymphocytes 1.36 Thousands/µL      Absolute Monocytes 0.34 Thousand/µL      Eosinophils Absolute 0.03 Thousand/µL      Basophils Absolute 0.02 Thousands/µL             XR chest 2 views   ED Interpretation by Jose John PA-C (06/08 1538)   No acute cardiopulmonary disease on personal interpretation          Procedures    ED Medication and Procedure Management   Prior to Admission Medications   Prescriptions Last Dose Informant Patient Reported? Taking?   mupirocin (BACTROBAN) 2 % nasal ointment Not Taking  No No   Sig: Apply to affected area twice  daily   Patient not taking: Reported on 6/8/2025      Facility-Administered Medications: None     Discharge Medication List as of 6/8/2025  4:11 PM        CONTINUE these medications which have NOT CHANGED    Details   mupirocin (BACTROBAN) 2 % nasal ointment Apply to affected area twice  daily, Print           No discharge procedures on file.  ED SEPSIS DOCUMENTATION   Time reflects when diagnosis was documented in both MDM as applicable and the Disposition within this note       Time User Action Codes Description Comment    6/8/2025  4:10 PM Jose John Add [M79.602] Left arm pain     6/8/2025  4:11 PM Jose John Add [R59.0] Axillary lymphadenopathy                      [1] No past medical history on file.  [2]   Past Surgical History:  Procedure Laterality Date    NO PAST SURGERIES      WRIST SURGERY Left    [3] No family history on file.  [4]   Social History  Tobacco Use    Smoking status: Never    Smokeless tobacco: Never   Substance Use Topics    Alcohol use: Yes     Comment: social    Drug use: Yes     Types: Marijuana        Jose John PA-C  06/08/25 [id number]

## 2025-06-09 ENCOUNTER — HOSPITAL ENCOUNTER (EMERGENCY)
Facility: HOSPITAL | Age: 25
Discharge: HOME | End: 2025-06-09
Attending: EMERGENCY MEDICINE | Admitting: EMERGENCY MEDICINE
Payer: COMMERCIAL

## 2025-06-09 VITALS
OXYGEN SATURATION: 95 % | SYSTOLIC BLOOD PRESSURE: 128 MMHG | HEART RATE: 86 BPM | DIASTOLIC BLOOD PRESSURE: 75 MMHG | TEMPERATURE: 98.1 F | RESPIRATION RATE: 20 BRPM

## 2025-06-09 DIAGNOSIS — E87.6 HYPOKALEMIA: ICD-10-CM

## 2025-06-09 DIAGNOSIS — R51.9 HEADACHE: ICD-10-CM

## 2025-06-09 DIAGNOSIS — M79.602 LEFT ARM PAIN: Primary | ICD-10-CM

## 2025-06-09 LAB
ALBUMIN SERPL BCG-MCNC: 4.1 G/DL (ref 3.5–5)
ALP SERPL-CCNC: 50 U/L (ref 34–104)
ALT SERPL W P-5'-P-CCNC: 6 U/L (ref 7–52)
ANION GAP SERPL CALCULATED.3IONS-SCNC: 8 MMOL/L (ref 4–13)
AST SERPL W P-5'-P-CCNC: 10 U/L (ref 13–39)
BASOPHILS # BLD AUTO: 0.03 THOUSANDS/ÂΜL (ref 0–0.1)
BASOPHILS NFR BLD AUTO: 0 % (ref 0–1)
BILIRUB SERPL-MCNC: 0.92 MG/DL (ref 0.2–1)
BUN SERPL-MCNC: 6 MG/DL (ref 5–25)
CALCIUM SERPL-MCNC: 8.2 MG/DL (ref 8.4–10.2)
CARDIAC TROPONIN I PNL SERPL HS: 3 NG/L (ref ?–50)
CHLORIDE SERPL-SCNC: 107 MMOL/L (ref 96–108)
CO2 SERPL-SCNC: 23 MMOL/L (ref 21–32)
CREAT SERPL-MCNC: 0.77 MG/DL (ref 0.6–1.3)
EOSINOPHIL # BLD AUTO: 0.06 THOUSAND/ÂΜL (ref 0–0.61)
EOSINOPHIL NFR BLD AUTO: 1 % (ref 0–6)
ERYTHROCYTE [DISTWIDTH] IN BLOOD BY AUTOMATED COUNT: 12.9 % (ref 11.6–15.1)
GFR SERPL CREATININE-BSD FRML MDRD: 127 ML/MIN/1.73SQ M
GLUCOSE SERPL-MCNC: 124 MG/DL (ref 65–140)
HCT VFR BLD AUTO: 40.7 % (ref 36.5–49.3)
HGB BLD-MCNC: 14 G/DL (ref 12–17)
IMM GRANULOCYTES # BLD AUTO: 0.01 THOUSAND/UL (ref 0–0.2)
IMM GRANULOCYTES NFR BLD AUTO: 0 % (ref 0–2)
LYMPHOCYTES # BLD AUTO: 2.31 THOUSANDS/ÂΜL (ref 0.6–4.47)
LYMPHOCYTES NFR BLD AUTO: 29 % (ref 14–44)
MCH RBC QN AUTO: 31.5 PG (ref 26.8–34.3)
MCHC RBC AUTO-ENTMCNC: 34.4 G/DL (ref 31.4–37.4)
MCV RBC AUTO: 92 FL (ref 82–98)
MONOCYTES # BLD AUTO: 0.54 THOUSAND/ÂΜL (ref 0.17–1.22)
MONOCYTES NFR BLD AUTO: 7 % (ref 4–12)
NEUTROPHILS # BLD AUTO: 4.99 THOUSANDS/ÂΜL (ref 1.85–7.62)
NEUTS SEG NFR BLD AUTO: 63 % (ref 43–75)
NRBC BLD AUTO-RTO: 0 /100 WBCS
PLATELET # BLD AUTO: 180 THOUSANDS/UL (ref 149–390)
PMV BLD AUTO: 11.6 FL (ref 8.9–12.7)
POTASSIUM SERPL-SCNC: 3 MMOL/L (ref 3.5–5.3)
PROT SERPL-MCNC: 6.2 G/DL (ref 6.4–8.4)
RBC # BLD AUTO: 4.45 MILLION/UL (ref 3.88–5.62)
SODIUM SERPL-SCNC: 138 MMOL/L (ref 135–147)
WBC # BLD AUTO: 7.94 THOUSAND/UL (ref 4.31–10.16)

## 2025-06-09 PROCEDURE — 80053 COMPREHEN METABOLIC PANEL: CPT

## 2025-06-09 PROCEDURE — 96375 TX/PRO/DX INJ NEW DRUG ADDON: CPT

## 2025-06-09 PROCEDURE — 99284 EMERGENCY DEPT VISIT MOD MDM: CPT | Performed by: EMERGENCY MEDICINE

## 2025-06-09 PROCEDURE — 99284 EMERGENCY DEPT VISIT MOD MDM: CPT

## 2025-06-09 PROCEDURE — 84484 ASSAY OF TROPONIN QUANT: CPT

## 2025-06-09 PROCEDURE — 85025 COMPLETE CBC W/AUTO DIFF WBC: CPT

## 2025-06-09 PROCEDURE — 96374 THER/PROPH/DIAG INJ IV PUSH: CPT

## 2025-06-09 PROCEDURE — 93005 ELECTROCARDIOGRAM TRACING: CPT

## 2025-06-09 PROCEDURE — 36415 COLL VENOUS BLD VENIPUNCTURE: CPT

## 2025-06-09 RX ORDER — POTASSIUM CHLORIDE 1500 MG/1
40 TABLET, EXTENDED RELEASE ORAL ONCE
Status: COMPLETED | OUTPATIENT
Start: 2025-06-09 | End: 2025-06-09

## 2025-06-09 RX ORDER — LANOLIN ALCOHOL/MO/W.PET/CERES
400 CREAM (GRAM) TOPICAL ONCE
Status: COMPLETED | OUTPATIENT
Start: 2025-06-09 | End: 2025-06-09

## 2025-06-09 RX ORDER — KETOROLAC TROMETHAMINE 30 MG/ML
15 INJECTION, SOLUTION INTRAMUSCULAR; INTRAVENOUS ONCE
Status: COMPLETED | OUTPATIENT
Start: 2025-06-09 | End: 2025-06-09

## 2025-06-09 RX ORDER — DROPERIDOL 2.5 MG/ML
0.62 INJECTION, SOLUTION INTRAMUSCULAR; INTRAVENOUS ONCE
Status: COMPLETED | OUTPATIENT
Start: 2025-06-09 | End: 2025-06-09

## 2025-06-09 RX ORDER — DIPHENHYDRAMINE HYDROCHLORIDE 50 MG/ML
25 INJECTION, SOLUTION INTRAMUSCULAR; INTRAVENOUS ONCE
Status: COMPLETED | OUTPATIENT
Start: 2025-06-09 | End: 2025-06-09

## 2025-06-09 RX ORDER — ASPIRIN 81 MG/1
324 TABLET, CHEWABLE ORAL ONCE
Status: COMPLETED | OUTPATIENT
Start: 2025-06-09 | End: 2025-06-09

## 2025-06-09 RX ADMIN — POTASSIUM CHLORIDE 40 MEQ: 1500 TABLET, EXTENDED RELEASE ORAL at 21:52

## 2025-06-09 RX ADMIN — Medication 400 MG: at 21:52

## 2025-06-09 RX ADMIN — ASPIRIN 81 MG CHEWABLE TABLET 324 MG: 81 TABLET CHEWABLE at 20:04

## 2025-06-09 RX ADMIN — DROPERIDOL 0.62 MG: 2.5 INJECTION, SOLUTION INTRAMUSCULAR; INTRAVENOUS at 21:05

## 2025-06-09 RX ADMIN — DIPHENHYDRAMINE HYDROCHLORIDE 25 MG: 50 INJECTION, SOLUTION INTRAMUSCULAR; INTRAVENOUS at 21:04

## 2025-06-09 RX ADMIN — KETOROLAC TROMETHAMINE 15 MG: 30 INJECTION, SOLUTION INTRAMUSCULAR; INTRAVENOUS at 21:01

## 2025-06-10 LAB
ATRIAL RATE: 82 BPM
P AXIS: 61 DEGREES
PR INTERVAL: 144 MS
QRS AXIS: 81 DEGREES
QRSD INTERVAL: 92 MS
QT INTERVAL: 352 MS
QTC INTERVAL: 411 MS
T WAVE AXIS: 48 DEGREES
VENTRICULAR RATE: 82 BPM

## 2025-06-10 PROCEDURE — 93010 ELECTROCARDIOGRAM REPORT: CPT

## 2025-06-10 NOTE — ED PROVIDER NOTES
Time reflects when diagnosis was documented in both MDM as applicable and the Disposition within this note       Time User Action Codes Description Comment    6/9/2025  8:55 PM Victor Hugo Aguirre [M79.602] Left arm pain     6/9/2025  8:55 PM Timothy Victor Hugo Rey [M54.10] Radiculopathy     6/9/2025  8:55 PM Timothy Victor Hugo Remove [M54.10] Radiculopathy     6/9/2025  8:55 PM Victor Hugo Aguirre [R51.9] Headache     6/9/2025  9:42 PM Aguirre Victor Hugo Rey [E87.6] Hypokalemia           ED Disposition       ED Disposition   Discharge    Condition   Stable    Date/Time   Mon Jun 9, 2025  9:42 PM    Comment   Sincere Chavez discharge to home/self care.                   Assessment & Plan       Medical Decision Making  Patient seen and evaluated.  Anxious but nontoxic-appearing.  There are no focal deficits.  Differential includes was not limited to anxiety, illness anxiety disorder, nerve impingement, headache.  Given reassuring workup yesterday, doubt ACS, thromboembolism.  Labs were drawn.  EKG without acute ischemia.  Plan for symptomatic and supportive treatment of his headache and outpatient follow-up.    Amount and/or Complexity of Data Reviewed  Independent Historian: parent  External Data Reviewed: labs, radiology, ECG and notes.  Labs:  Decision-making details documented in ED Course.  ECG/medicine tests:  Decision-making details documented in ED Course.    Risk  OTC drugs.  Prescription drug management.        ED Course as of 06/09/25 2216   Mon Jun 09, 2025 2006 ECG 12 lead  I independently reviewed EKG which shows normal sinus rhythm, heart rate 82 bpm.  Normal axis.  Intervals normal.  No STEMI   2047 CBC and differential  Normal   2047 hs TnI 0hr: 3  Negative, not consistent with ACS.  Troponin was 3 yesterday, no change.  No suspicion for ACS based on history.  Troponin was already ordered prior to evaluation.   2141 Potassium(!): 3.0  Hypokalemic.  Will replace magnesium and potassium.   2143 Reevaluation  patient feels improved.  He is interested in going home.  Discussed findings from CMP.  Replacing potassium and magnesium.  Understands follow-up to sports medicine.       Medications   aspirin chewable tablet 324 mg (324 mg Oral Given 6/9/25 2004)   droperidol (INAPSINE) injection 0.625 mg (0.625 mg Intravenous Given 6/9/25 2105)   ketorolac (TORADOL) injection 15 mg (15 mg Intravenous Given 6/9/25 2101)   diphenhydrAMINE (BENADRYL) injection 25 mg (25 mg Intravenous Given 6/9/25 2104)   potassium chloride (Klor-Con M20) CR tablet 40 mEq (40 mEq Oral Given 6/9/25 2152)   magnesium Oxide (MAG-OX) tablet 400 mg (400 mg Oral Given 6/9/25 2152)       ED Risk Strat Scores                    No data recorded                            History of Present Illness       Chief Complaint   Patient presents with    Arm Pain     Patient arrives via ems, c/o left arm pain and numbness, also some chest pain, patient seen for same yesterday        Past Medical History[1]   Past Surgical History[2]   Family History[3]   Social History[4]   E-Cigarette/Vaping      E-Cigarette/Vaping Substances      I have reviewed and agree with the history as documented.     Patient is a 24-year-old man who presents for left arm tingling, headache.  He was seen yesterday for evaluation of left axilla infection.  Patient was extremely anxious yesterday about his situation.  Had an extensive workup, cardiac enzymes/D-dimer unremarkable.  Has been sleeping on a different mattress recently.  He was discharged in a stable condition for follow-up with his PCP.  He presents today for evaluation of similar symptoms and a headache.  Felt like his left arm was numb and tingly went from his fingers up to his chest.  Did not take anything prior to arrival but was given aspirin.  Denying any other systemic symptoms.      History provided by:  Medical records, patient and parent  Arm Pain  Associated symptoms: chest pain and headaches    Associated symptoms: no  abdominal pain, no cough, no fever, no nausea, no shortness of breath and no vomiting        Review of Systems   Constitutional:  Negative for fever.   Respiratory:  Negative for cough and shortness of breath.    Cardiovascular:  Positive for chest pain. Negative for palpitations.   Gastrointestinal:  Negative for abdominal pain, nausea and vomiting.   Musculoskeletal:  Positive for arthralgias.   Neurological:  Positive for numbness and headaches. Negative for seizures, syncope and weakness.   Psychiatric/Behavioral:  The patient is nervous/anxious.    All other systems reviewed and are negative.          Objective       ED Triage Vitals [06/09/25 1900]   Temperature Pulse Blood Pressure Respirations SpO2 Patient Position - Orthostatic VS   98.1 °F (36.7 °C) 89 154/80 19 98 % Lying      Temp Source Heart Rate Source BP Location FiO2 (%) Pain Score    Oral Monitor Right arm -- 7      Vitals      Date and Time Temp Pulse SpO2 Resp BP Pain Score FACES Pain Rating User   06/09/25 2139 -- -- -- -- -- 3 -- AEN   06/09/25 2115 -- 86 95 % 20 128/75 -- -- AEN   06/09/25 2101 -- -- -- -- -- 5 -- AEN   06/09/25 1900 98.1 °F (36.7 °C) 89 98 % 19 154/80 7 -- BLG            Physical Exam  Vitals and nursing note reviewed.   Constitutional:       General: He is not in acute distress.     Appearance: He is well-developed.   HENT:      Head: Normocephalic and atraumatic.     Eyes:      Extraocular Movements: Extraocular movements intact.      Conjunctiva/sclera: Conjunctivae normal.       Cardiovascular:      Rate and Rhythm: Normal rate and regular rhythm.      Heart sounds: Normal heart sounds. No murmur heard.  Pulmonary:      Effort: Pulmonary effort is normal. No respiratory distress.      Breath sounds: Normal breath sounds.   Abdominal:      General: Abdomen is flat.      Palpations: Abdomen is soft.      Tenderness: There is no abdominal tenderness.     Musculoskeletal:         General: No swelling.      Cervical back: Neck  supple.     Skin:     General: Skin is warm and dry.      Capillary Refill: Capillary refill takes less than 2 seconds.     Neurological:      General: No focal deficit present.      Mental Status: He is alert.      GCS: GCS eye subscore is 4. GCS verbal subscore is 5. GCS motor subscore is 6.      Cranial Nerves: No dysarthria or facial asymmetry.      Motor: No abnormal muscle tone or seizure activity.      Coordination: Finger-Nose-Finger Test normal.      Comments: Neurovascularly intact bilateral upper extremities.  No sensory deficits to the left upper extremity.  Able to give a thumbs up, extend second finger.  Able to flex and extend at the wrist.  Able to flex and extend at the elbow bilaterally.  Strength is symmetric.  +2 radial pulse.   Psychiatric:         Mood and Affect: Mood is anxious.         Results Reviewed       Procedure Component Value Units Date/Time    Comprehensive metabolic panel [[id number]]  (Abnormal) Collected: 06/09/25 2100    Lab Status: Final result Specimen: Blood from Arm, Left Updated: 06/09/25 2141     Sodium 138 mmol/L      Potassium 3.0 mmol/L      Chloride 107 mmol/L      CO2 23 mmol/L      ANION GAP 8 mmol/L      BUN 6 mg/dL      Creatinine 0.77 mg/dL      Glucose 124 mg/dL      Calcium 8.2 mg/dL      AST 10 U/L      ALT 6 U/L      Alkaline Phosphatase 50 U/L      Total Protein 6.2 g/dL      Albumin 4.1 g/dL      Total Bilirubin 0.92 mg/dL      eGFR 127 ml/min/1.73sq m     Narrative:      National Kidney Disease Foundation guidelines for Chronic Kidney Disease (CKD):     Stage 1 with normal or high GFR (GFR > 90 mL/min/1.73 square meters)    Stage 2 Mild CKD (GFR = 60-89 mL/min/1.73 square meters)    Stage 3A Moderate CKD (GFR = 45-59 mL/min/1.73 square meters)    Stage 3B Moderate CKD (GFR = 30-44 mL/min/1.73 square meters)    Stage 4 Severe CKD (GFR = 15-29 mL/min/1.73 square meters)    Stage 5 End Stage CKD (GFR <15 mL/min/1.73 square meters)  Note: GFR calculation is  accurate only with a steady state creatinine    HS Troponin 0hr (reflex protocol) [[id number]]  (Normal) Collected: 06/09/25 2014    Lab Status: Final result Specimen: Blood from Arm, Left Updated: 06/09/25 2042     hs TnI 0hr 3 ng/L     CBC and differential [[id number]] Collected: 06/09/25 2014    Lab Status: Final result Specimen: Blood from Arm, Left Updated: 06/09/25 2021     WBC 7.94 Thousand/uL      RBC 4.45 Million/uL      Hemoglobin 14.0 g/dL      Hematocrit 40.7 %      MCV 92 fL      MCH 31.5 pg      MCHC 34.4 g/dL      RDW 12.9 %      MPV 11.6 fL      Platelets 180 Thousands/uL      nRBC 0 /100 WBCs      Segmented % 63 %      Immature Grans % 0 %      Lymphocytes % 29 %      Monocytes % 7 %      Eosinophils Relative 1 %      Basophils Relative 0 %      Absolute Neutrophils 4.99 Thousands/µL      Absolute Immature Grans 0.01 Thousand/uL      Absolute Lymphocytes 2.31 Thousands/µL      Absolute Monocytes 0.54 Thousand/µL      Eosinophils Absolute 0.06 Thousand/µL      Basophils Absolute 0.03 Thousands/µL             No orders to display       Procedures    ED Medication and Procedure Management   Prior to Admission Medications   Prescriptions Last Dose Informant Patient Reported? Taking?   mupirocin (BACTROBAN) 2 % nasal ointment   No No   Sig: Apply to affected area twice  daily   Patient not taking: Reported on 6/8/2025      Facility-Administered Medications: None     Discharge Medication List as of 6/9/2025  9:42 PM        CONTINUE these medications which have NOT CHANGED    Details   mupirocin (BACTROBAN) 2 % nasal ointment Apply to affected area twice  daily, Print             ED SEPSIS DOCUMENTATION   Time reflects when diagnosis was documented in both MDM as applicable and the Disposition within this note       Time User Action Codes Description Comment    6/9/2025  8:55 PM Victor Hugo Aguirre [M79.602] Left arm pain     6/9/2025  8:55 PM Victor Hugo Aguirre [M54.10] Radiculopathy     6/9/2025  8:55 PM  Victor Hugo Aguirre Remove [M54.10] Radiculopathy     6/9/2025  8:55 PM Victor Hugo Aguirre [R51.9] Headache     6/9/2025  9:42 PM Victor Hugo Aguirre [E87.6] Hypokalemia                    [1] No past medical history on file.  [2]   Past Surgical History:  Procedure Laterality Date    NO PAST SURGERIES      WRIST SURGERY Left    [3] No family history on file.  [4]   Social History  Tobacco Use    Smoking status: Never    Smokeless tobacco: Never   Substance Use Topics    Alcohol use: Yes     Comment: social    Drug use: Yes     Types: Marijuana        Victor Hugo Aguirre,   06/09/25 [id number]

## 2025-06-11 ENCOUNTER — HOSPITAL ENCOUNTER (EMERGENCY)
Facility: HOSPITAL | Age: 25
Discharge: HOME | End: 2025-06-11
Attending: EMERGENCY MEDICINE | Admitting: EMERGENCY MEDICINE
Payer: COMMERCIAL

## 2025-06-11 VITALS
RESPIRATION RATE: 16 BRPM | TEMPERATURE: 98.4 F | HEART RATE: 95 BPM | DIASTOLIC BLOOD PRESSURE: 84 MMHG | OXYGEN SATURATION: 96 % | SYSTOLIC BLOOD PRESSURE: 138 MMHG | WEIGHT: 125.44 LBS

## 2025-06-11 DIAGNOSIS — R45.89 ANXIETY ABOUT HEALTH: ICD-10-CM

## 2025-06-11 DIAGNOSIS — R20.2 PARESTHESIA OF LEFT ARM: Primary | ICD-10-CM

## 2025-06-11 DIAGNOSIS — M79.602 LEFT ARM PAIN: ICD-10-CM

## 2025-06-11 PROCEDURE — 93005 ELECTROCARDIOGRAM TRACING: CPT

## 2025-06-11 PROCEDURE — 99284 EMERGENCY DEPT VISIT MOD MDM: CPT | Performed by: EMERGENCY MEDICINE

## 2025-06-11 PROCEDURE — 99283 EMERGENCY DEPT VISIT LOW MDM: CPT

## 2025-06-11 RX ORDER — METHYLPREDNISOLONE 4 MG/1
TABLET ORAL
Qty: 21 TABLET | Refills: 0 | Status: SHIPPED | OUTPATIENT
Start: 2025-06-11

## 2025-06-11 NOTE — ED PROVIDER NOTES
Time reflects when diagnosis was documented in both MDM as applicable and the Disposition within this note       Time User Action Codes Description Comment    6/11/2025  4:56 PM Sandra Whitaker Add [R20.2] Paresthesia of left arm     6/11/2025  4:56 PM Sandra Whitaker Add [M79.602] Left arm pain     6/11/2025  6:16 PM Sandra Whitaker Add [R45.89] Anxiety about health           ED Disposition       ED Disposition   Discharge    Condition   Stable    Date/Time   Wed Jun 11, 2025  4:56 PM    Comment   Sincere Chavez discharge to home/self care.                   Assessment & Plan       Medical Decision Making  LUE pain/paresthesias - ?some radicular component.  No injury or activity that would obviously precipitate any type of brachial plexopathy.  Will give medrol dose pack for symptom management and encouraged f/u /w/ PCP.  Recommended pt d/w PCP ?ortho, PT, imaging for further evaluation     Problems Addressed:  Left arm pain: undiagnosed new problem with uncertain prognosis  Paresthesia of left arm: undiagnosed new problem with uncertain prognosis    Amount and/or Complexity of Data Reviewed  External Data Reviewed: notes.     Details: Prior visits reviewed    Risk  Prescription drug management.             Medications - No data to display    ED Risk Strat Scores                    No data recorded        SBIRT 22yo+      Flowsheet Row Most Recent Value   Initial Alcohol Screen: US AUDIT-C     1. How often do you have a drink containing alcohol? 0 Filed at: 06/11/2025 1603   2. How many drinks containing alcohol do you have on a typical day you are drinking?  0 Filed at: 06/11/2025 1603   3a. Male UNDER 65: How often do you have five or more drinks on one occasion? 0 Filed at: 06/11/2025 1603   Audit-C Score 0 Filed at: 06/11/2025 1603   LADY: How many times in the past year have you...    Used an illegal drug or used a prescription medication for non-medical reasons? Never Filed at: 06/11/2025 1603                             History of Present Illness       Chief Complaint   Patient presents with    Arm Pain     Patient reports left arm pain that travels into left side of chest. Recently evaluated on the 8th and 9th with a negative cardiac work up. Appointment with PCP scheduled for Monday. Also complains of headache, dizziness, and sob with pain.        Past Medical History[1]   Past Surgical History[2]   Family History[3]   Social History[4]   E-Cigarette/Vaping      E-Cigarette/Vaping Substances    Nicotine Yes     THC No     CBD No     Flavoring No     Other No     Unknown No       I have reviewed and agree with the history as documented.     Patient presents with:  Arm Pain: Patient reports left arm pain that travels into left side of chest. Recently evaluated on the 8th and 9th with a negative cardiac work up. Appointment with PCP scheduled for Monday. Also complains of headache, dizziness, and sob with pain.     24y M here for ongoing left arm pain.  Pt reports about 4d of symptoms.  Per chart review, pt reporting pain/tingling - seems to start in the left ring finger and radiate up the arm and into the chest/neck region.  Pain will sometimes seem to go from the mid upper arm down to the hand/over into the chest.  Pt w/ negative w/u 6/8 and 6/9 and has f/u w/ PCP for further evaluation on Monday.    Pt denies any falls or injuries, doesn't wear a back pack/tight straps across the chest / under the arm. No changes in activities. No swelling or color changes to the LUE.  Denies any weakness - just the paresthesias.  Does note increased symptoms w/ certain positions / movements.    Additionally, does report some numbness/tingling to the ring/little finger when he first wakes up in the am and does report sleeping w/ his left arm flexed at the elbow w/ his hand under his head/pillow.  That sensation improves a little once he's been up and he doesn't know if it's related to his current symptoms or not.    No  f/c/s, no rashes or skin changes.  As noted in previous charts, pt is concerned about a lump/nodule in the left axilla that has been felt to be a prominent lymph node.      History provided by:  Patient and medical records   used: No    Arm Pain      Review of Systems   All other systems reviewed and are negative.          Objective       ED Triage Vitals [06/11/25 1603]   Temperature Pulse Blood Pressure Respirations SpO2 Patient Position - Orthostatic VS   98.4 °F (36.9 °C) 95 138/84 16 96 % Sitting      Temp Source Heart Rate Source BP Location FiO2 (%) Pain Score    Oral Monitor Right arm -- 7      Vitals      Date and Time Temp Pulse SpO2 Resp BP Pain Score FACES Pain Rating User   06/11/25 1603 98.4 °F (36.9 °C) 95 96 % 16 138/84 7 -- SG            Physical Exam  Vitals and nursing note reviewed.   Constitutional:       General: He is not in acute distress.     Appearance: Normal appearance. He is not ill-appearing, toxic-appearing or diaphoretic.     Eyes:      Conjunctiva/sclera: Conjunctivae normal.       Cardiovascular:      Rate and Rhythm: Normal rate.   Pulmonary:      Effort: Pulmonary effort is normal.     Musculoskeletal:      Left shoulder: Normal.      Left upper arm: Tenderness present. No swelling, edema, deformity, lacerations or bony tenderness.      Left forearm: Tenderness (tenderness in the cubital tunnel w/ some reproduction of paresthesias) present. No swelling, edema, deformity, lacerations or bony tenderness.      Left wrist: No swelling or crepitus. Normal pulse.      Left hand: Normal strength. Normal sensation. There is no disruption of two-point discrimination. Normal capillary refill. Normal pulse.        Arms:       Cervical back: Normal range of motion. No bony tenderness.     Skin:     Findings: No rash.     Neurological:      General: No focal deficit present.      Mental Status: He is alert.      Sensory: No sensory deficit.      Motor: No weakness.      Psychiatric:         Mood and Affect: Mood is anxious.         Results Reviewed       None            No orders to display       Procedures    ED Medication and Procedure Management   Prior to Admission Medications   Prescriptions Last Dose Informant Patient Reported? Taking?   mupirocin (BACTROBAN) 2 % nasal ointment   No No   Sig: Apply to affected area twice  daily   Patient not taking: Reported on 6/8/2025      Facility-Administered Medications: None     Discharge Medication List as of 6/11/2025  4:59 PM        START taking these medications    Details   methylPREDNISolone 4 MG tablet therapy pack Use as directed on package, Normal           CONTINUE these medications which have NOT CHANGED    Details   mupirocin (BACTROBAN) 2 % nasal ointment Apply to affected area twice  daily, Print           No discharge procedures on file.  ED SEPSIS DOCUMENTATION   Time reflects when diagnosis was documented in both MDM as applicable and the Disposition within this note       Time User Action Codes Description Comment    6/11/2025  4:56 PM Sandra Whitaker [R20.2] Paresthesia of left arm     6/11/2025  4:56 PM Sandra Whitaker [M79.602] Left arm pain     6/11/2025  6:16 PM Sandra Whitaker [R45.89] Anxiety about health                      [1] No past medical history on file.  [2]   Past Surgical History:  Procedure Laterality Date    NO PAST SURGERIES      WRIST SURGERY Left    [3] No family history on file.  [4]   Social History  Tobacco Use    Smoking status: Never    Smokeless tobacco: Never   Substance Use Topics    Alcohol use: Yes     Comment: social    Drug use: Yes     Types: Marijuana        Sandra Whitaker DO  06/11/25 1816

## 2025-06-11 NOTE — DISCHARGE INSTRUCTIONS
Follow up with your Primary care provider on Monday as previously scheduled.    Discuss with your Primary Care provider if you would benefit from seeing orthopedics and / or physical therapy.

## 2025-06-12 LAB
ATRIAL RATE: 105 BPM
P AXIS: 91 DEGREES
PR INTERVAL: 140 MS
QRS AXIS: 80 DEGREES
QRSD INTERVAL: 78 MS
QT INTERVAL: 332 MS
QTC INTERVAL: 438 MS
T WAVE AXIS: 49 DEGREES
VENTRICULAR RATE: 105 BPM

## 2025-06-12 PROCEDURE — 93010 ELECTROCARDIOGRAM REPORT: CPT | Performed by: INTERNAL MEDICINE

## 2025-06-16 ENCOUNTER — OFFICE VISIT (OUTPATIENT)
Age: 25
End: 2025-06-16
Payer: COMMERCIAL

## 2025-06-16 ENCOUNTER — APPOINTMENT (OUTPATIENT)
Age: 25
End: 2025-06-16
Attending: ORTHOPAEDIC SURGERY
Payer: COMMERCIAL

## 2025-06-16 VITALS — HEIGHT: 68 IN | BODY MASS INDEX: 18.94 KG/M2 | WEIGHT: 125 LBS

## 2025-06-16 DIAGNOSIS — M25.512 LEFT SHOULDER PAIN, UNSPECIFIED CHRONICITY: Primary | ICD-10-CM

## 2025-06-16 DIAGNOSIS — M25.512 LEFT SHOULDER PAIN, UNSPECIFIED CHRONICITY: ICD-10-CM

## 2025-06-16 PROCEDURE — 99203 OFFICE O/P NEW LOW 30 MIN: CPT | Performed by: ORTHOPAEDIC SURGERY

## 2025-06-16 PROCEDURE — 73030 X-RAY EXAM OF SHOULDER: CPT

## 2025-06-16 NOTE — PROGRESS NOTES
:  Assessment & Plan  Left shoulder pain, unspecified chronicity    Orders:    XR shoulder 2+ vw left; Future    LEFT upper extremity brachial plexopathy  Discussed the paresthesias down the arm, likely will take 2-3 months to resolve  Medrol pack   Follow up 6 weeks   EMG if no relief       REASON FOR VISIT  Chief Complaint   Patient presents with    Left Arm - Pain       HISTORY OF PRESENT ILLNESS:  Sincesanjana Chavez is a 24 y.o. year old left hand dominant male who presents with LEFT upper extremity numbness/tingling down arm    LEFT sided paresthesias     1 week ago, slept on the arm with arm against side of head    Woke up with this after sleeping on it awkwardly     LEFT wrist surgery 6-7 years ago  Had some numbness in the ulnar fingers at that time which was stable until now with the new numbness/tingling down the arm      Past Medical and Surgical History:  Past Medical History[1]    Past Surgical History[2]    Medications:  Current Medications[3]    Allergies:  Allergies[4]      PE:  Pertinent Positive Findings in shoulder exam:    Motion (AROM-PROM):    Forward Flexion R: 160 L : 160   Abduction: R: 140 L: 140   External Rotation: R: 60 L : 60  Internal rotation Adduction: R: T10 L: T10  ABduction/ER: Right 80@ 90 degrees, Left: 80@ 90 degrees   Abduction/lR: Right 80@ 90 degrees, Left: 80@ 90 degrees      LEFT shoulder:    Supraspinatus strength 5/5   lnfraspinatus strength 5/5   Tenderness [] AC joint [] Biceps Groove [] None   Cross body adduction [] Positive [] Negative   Moran' [] Positive [] Negative   Neer's [] Positive [] Negative   Empty Can [] Positive [] Negative   ER lag [] Positive [] Negative   Horn blower's [] Positive [] Negative   Belly Press [] Positive [] Negative   Lift Off [] Positive [] Negative   Bear Hug [] Positive [] Negative   [place]'s sign [] Positive [] Negative   Speed's sign [] Positive [] Negative     Sensory: slight decreased sensation in axillary, lateral antebrachial  cutaneous, median, superficial branch radial, and ulnar nerve distributions.    Vascular exam: warm and well perfused digits.     Skin: intact, no rashes or lesions.      Radiology: I independently reviewed and interpreted the images.  Radiographs: LEFT shoulder X-Ray: no significant signs of arthritis with preserved joint space      CC:  MD Timothy King, Victor Hugo Lagos F*         [1] No past medical history on file.  [2]   Past Surgical History:  Procedure Laterality Date    NO PAST SURGERIES      WRIST SURGERY Left    [3]   Current Outpatient Medications:     methylPREDNISolone 4 MG tablet therapy pack, Use as directed on package (Patient not taking: Reported on 6/16/2025), Disp: 21 tablet, Rfl: 0    mupirocin (BACTROBAN) 2 % nasal ointment, Apply to affected area twice  daily (Patient not taking: Reported on 6/16/2025), Disp: 1 g, Rfl: 0  [4]   Allergies  Allergen Reactions    Shellfish-Derived Products - Food Allergy Rash